# Patient Record
Sex: MALE | Race: WHITE | NOT HISPANIC OR LATINO | Employment: FULL TIME | RURAL
[De-identification: names, ages, dates, MRNs, and addresses within clinical notes are randomized per-mention and may not be internally consistent; named-entity substitution may affect disease eponyms.]

---

## 2021-09-15 ENCOUNTER — CLINICAL SUPPORT (OUTPATIENT)
Dept: PRIMARY CARE CLINIC | Facility: CLINIC | Age: 51
End: 2021-09-15

## 2021-09-15 PROCEDURE — 99172 PR VISUAL FUNCT SCREENING, BILAT: ICD-10-PCS | Mod: ,,, | Performed by: NURSE PRACTITIONER

## 2021-09-15 PROCEDURE — 99172 OCULAR FUNCTION SCREEN: CPT | Mod: ,,, | Performed by: NURSE PRACTITIONER

## 2022-10-05 ENCOUNTER — CLINICAL SUPPORT (OUTPATIENT)
Dept: PRIMARY CARE CLINIC | Facility: CLINIC | Age: 52
End: 2022-10-05

## 2022-10-05 DIAGNOSIS — Z01.00 ENCOUNTER FOR VISION SCREENING: Primary | ICD-10-CM

## 2022-10-05 PROCEDURE — 99172 OCULAR FUNCTION SCREEN: CPT | Mod: ,,, | Performed by: NURSE PRACTITIONER

## 2022-10-05 PROCEDURE — 99172 PR VISUAL FUNCT SCREENING, BILAT: ICD-10-PCS | Mod: ,,, | Performed by: NURSE PRACTITIONER

## 2022-10-05 NOTE — PROGRESS NOTES
Subjective:       Patient ID: Burt Fairchild is a 52 y.o. male.    Chief Complaint: No chief complaint on file.    HPI  Review of Systems      Objective:      Physical Exam    Assessment:       Problem List Items Addressed This Visit    None  Visit Diagnoses       Encounter for vision screening    -  Primary            Plan:       Vision exam only

## 2023-11-15 ENCOUNTER — CLINICAL SUPPORT (OUTPATIENT)
Dept: PRIMARY CARE CLINIC | Facility: CLINIC | Age: 53
End: 2023-11-15
Payer: COMMERCIAL

## 2023-11-15 DIAGNOSIS — Z01.00 ENCOUNTER FOR VISION SCREENING: Primary | ICD-10-CM

## 2023-11-15 PROCEDURE — 99172 PR VISUAL FUNCT SCREENING, BILAT: ICD-10-PCS | Mod: ,,, | Performed by: NURSE PRACTITIONER

## 2023-11-15 PROCEDURE — 99172 OCULAR FUNCTION SCREEN: CPT | Mod: ,,, | Performed by: NURSE PRACTITIONER

## 2023-11-15 NOTE — PROGRESS NOTES
Subjective     Patient ID: Burt Fairchild is a 53 y.o. male.    Chief Complaint: No chief complaint on file.    HPI  Review of Systems       Objective     Physical Exam       Assessment and Plan     1. Encounter for vision screening        Vision exam only           No follow-ups on file.

## 2024-01-11 ENCOUNTER — OFFICE VISIT (OUTPATIENT)
Dept: FAMILY MEDICINE | Facility: CLINIC | Age: 54
End: 2024-01-11
Payer: COMMERCIAL

## 2024-01-11 VITALS
OXYGEN SATURATION: 98 % | TEMPERATURE: 98 F | RESPIRATION RATE: 16 BRPM | HEIGHT: 66 IN | DIASTOLIC BLOOD PRESSURE: 70 MMHG | SYSTOLIC BLOOD PRESSURE: 120 MMHG | WEIGHT: 169.31 LBS | BODY MASS INDEX: 27.21 KG/M2 | HEART RATE: 73 BPM

## 2024-01-11 DIAGNOSIS — R05.9 COUGH, UNSPECIFIED TYPE: ICD-10-CM

## 2024-01-11 DIAGNOSIS — J06.9 ACUTE UPPER RESPIRATORY INFECTION: ICD-10-CM

## 2024-01-11 DIAGNOSIS — J01.90 ACUTE NON-RECURRENT SINUSITIS, UNSPECIFIED LOCATION: Primary | ICD-10-CM

## 2024-01-11 LAB
CTP QC/QA: YES
CTP QC/QA: YES
FLUAV AG NPH QL: NEGATIVE
FLUBV AG NPH QL: NEGATIVE
SARS-COV-2 RDRP RESP QL NAA+PROBE: NEGATIVE

## 2024-01-11 PROCEDURE — 1160F RVW MEDS BY RX/DR IN RCRD: CPT | Mod: CPTII,,, | Performed by: NURSE PRACTITIONER

## 2024-01-11 PROCEDURE — 87635 SARS-COV-2 COVID-19 AMP PRB: CPT | Mod: QW,,, | Performed by: NURSE PRACTITIONER

## 2024-01-11 PROCEDURE — 3078F DIAST BP <80 MM HG: CPT | Mod: CPTII,,, | Performed by: NURSE PRACTITIONER

## 2024-01-11 PROCEDURE — 1159F MED LIST DOCD IN RCRD: CPT | Mod: CPTII,,, | Performed by: NURSE PRACTITIONER

## 2024-01-11 PROCEDURE — 87804 INFLUENZA ASSAY W/OPTIC: CPT | Mod: 59,QW,, | Performed by: NURSE PRACTITIONER

## 2024-01-11 PROCEDURE — 3008F BODY MASS INDEX DOCD: CPT | Mod: CPTII,,, | Performed by: NURSE PRACTITIONER

## 2024-01-11 PROCEDURE — 3074F SYST BP LT 130 MM HG: CPT | Mod: CPTII,,, | Performed by: NURSE PRACTITIONER

## 2024-01-11 PROCEDURE — 99203 OFFICE O/P NEW LOW 30 MIN: CPT | Mod: 25,,, | Performed by: NURSE PRACTITIONER

## 2024-01-11 PROCEDURE — 96372 THER/PROPH/DIAG INJ SC/IM: CPT | Mod: ,,, | Performed by: NURSE PRACTITIONER

## 2024-01-11 RX ORDER — CEFTRIAXONE 1 G/1
1 INJECTION, POWDER, FOR SOLUTION INTRAMUSCULAR; INTRAVENOUS
Status: COMPLETED | OUTPATIENT
Start: 2024-01-11 | End: 2024-01-11

## 2024-01-11 RX ORDER — AMOXICILLIN AND CLAVULANATE POTASSIUM 875; 125 MG/1; MG/1
1 TABLET, FILM COATED ORAL 2 TIMES DAILY
Qty: 20 TABLET | Refills: 0 | Status: SHIPPED | OUTPATIENT
Start: 2024-01-11 | End: 2024-01-21

## 2024-01-11 RX ORDER — DEXAMETHASONE SODIUM PHOSPHATE 4 MG/ML
8 INJECTION, SOLUTION INTRA-ARTICULAR; INTRALESIONAL; INTRAMUSCULAR; INTRAVENOUS; SOFT TISSUE
Status: COMPLETED | OUTPATIENT
Start: 2024-01-11 | End: 2024-01-11

## 2024-01-11 RX ORDER — LATANOPROST 50 UG/ML
1 SOLUTION/ DROPS OPHTHALMIC DAILY
COMMUNITY

## 2024-01-11 RX ADMIN — DEXAMETHASONE SODIUM PHOSPHATE 8 MG: 4 INJECTION, SOLUTION INTRA-ARTICULAR; INTRALESIONAL; INTRAMUSCULAR; INTRAVENOUS; SOFT TISSUE at 01:01

## 2024-01-11 RX ADMIN — CEFTRIAXONE 1 G: 1 INJECTION, POWDER, FOR SOLUTION INTRAMUSCULAR; INTRAVENOUS at 01:01

## 2024-01-11 NOTE — PROGRESS NOTES
"Subjective:       Burt Fairchild is a 53 y.o. male who presents for evaluation of symptoms of a URI. Symptoms include congestion, cough described as productive, headache described as pressure, post nasal drip, and sore throat. Onset of symptoms was 2 weeks ago, and has been unchanged since that time. Treatment to date: decongestants and theraflu .    Review of Systems  Pertinent items are noted in HPI.     Objective:      /70 (BP Location: Left arm, Patient Position: Sitting, BP Method: Large (Manual))   Pulse 73   Temp 98.3 °F (36.8 °C) (Oral)   Resp 16   Ht 5' 6" (1.676 m)   Wt 76.8 kg (169 lb 4.8 oz)   SpO2 98%   BMI 27.33 kg/m²   General appearance: alert, appears stated age, and cooperative  Head: Normocephalic, without obvious abnormality, atraumatic  Eyes: conjunctivae/corneas clear. PERRL, EOM's intact. Fundi benign.  Ears: abnormal TM right ear - dull and abnormal TM left ear - dull  Nose: Nares normal. Septum midline. Mucosa normal. No drainage or sinus tenderness., moderate congestion  Throat: abnormal findings: mild oropharyngeal erythema and PND  Lungs: clear to auscultation bilaterally  Heart: regular rate and rhythm, S1, S2 normal, no murmur, click, rub or gallop  Extremities: extremities normal, atraumatic, no cyanosis or edema  Skin: Skin color, texture, turgor normal. No rashes or lesions  Lymph nodes: Cervical, supraclavicular, and axillary nodes normal.  Neurologic: Alert and oriented X 3, normal strength and tone. Normal symmetric reflexes. Normal coordination and gait     Assessment:      sinusitis     Plan:      Discussed diagnosis and treatment of URI.  Suggested symptomatic OTC remedies.  Nasal saline spray for congestion.  Augmentin per orders.  Follow up as needed.   "

## 2024-02-01 ENCOUNTER — OFFICE VISIT (OUTPATIENT)
Dept: PRIMARY CARE CLINIC | Facility: CLINIC | Age: 54
End: 2024-02-01
Payer: OTHER MISCELLANEOUS

## 2024-02-01 ENCOUNTER — HOSPITAL ENCOUNTER (OUTPATIENT)
Dept: RADIOLOGY | Facility: HOSPITAL | Age: 54
Discharge: HOME | End: 2024-02-01
Attending: NURSE PRACTITIONER
Payer: COMMERCIAL

## 2024-02-01 VITALS
OXYGEN SATURATION: 97 % | SYSTOLIC BLOOD PRESSURE: 131 MMHG | BODY MASS INDEX: 27.16 KG/M2 | WEIGHT: 169 LBS | DIASTOLIC BLOOD PRESSURE: 79 MMHG | TEMPERATURE: 99 F | RESPIRATION RATE: 20 BRPM | HEIGHT: 66 IN | HEART RATE: 70 BPM

## 2024-02-01 DIAGNOSIS — S60.222A CONTUSION OF LEFT HAND, INITIAL ENCOUNTER: Primary | ICD-10-CM

## 2024-02-01 DIAGNOSIS — S60.222A CONTUSION OF LEFT HAND, INITIAL ENCOUNTER: ICD-10-CM

## 2024-02-01 PROCEDURE — 99203 OFFICE O/P NEW LOW 30 MIN: CPT | Mod: ,,, | Performed by: NURSE PRACTITIONER

## 2024-02-01 PROCEDURE — 73130 X-RAY EXAM OF HAND: CPT | Mod: 26,LT,, | Performed by: RADIOLOGY

## 2024-02-01 PROCEDURE — 73130 X-RAY EXAM OF HAND: CPT | Mod: TC,LT

## 2024-02-01 NOTE — PROGRESS NOTES
Subjective     Patient ID: Burt Fairchild is a 53 y.o. male.    Chief Complaint: Work Related Injury (Patient presents here today with work related injury to left hand that occurred on 2/1/24.)    54 y/o wm presents with complaints of left hand pain, mcgrath side. 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 There is a knot on the mcgrath side MCP joint 3rd finger. Unable to completely close hand especially the 3rd finger due to pain and stiffness.       Review of Systems   Musculoskeletal:  Positive for joint swelling. Negative for joint deformity.   All other systems reviewed and are negative.     Objective     Physical Exam  Vitals and nursing note reviewed.   Constitutional:       Appearance: Normal appearance.   HENT:      Head: Normocephalic.   Eyes:      Pupils: Pupils are equal, round, and reactive to light.   Cardiovascular:      Rate and Rhythm: Normal rate and regular rhythm.      Heart sounds: Normal heart sounds.   Pulmonary:      Effort: Pulmonary effort is normal.      Breath sounds: Normal breath sounds.   Musculoskeletal:         General: Swelling, tenderness and signs of injury present.      Left hand: Tenderness present. Decreased range of motion.        Arms:       Cervical back: Normal range of motion.   Skin:     General: Skin is warm and dry.      Findings: No bruising.   Neurological:      General: No focal deficit present.      Mental Status: He is alert and oriented to person, place, and time.   Psychiatric:         Attention and Perception: Attention and perception normal.         Mood and Affect: Mood normal.         Speech: Speech normal.         Behavior: Behavior normal. Behavior is cooperative.         Cognition and Memory: Cognition normal.         Judgment: Judgment normal.       Imaging: X-Ray Hand 3 view Left    Result Date: 2/1/2024  EXAMINATION: XR HAND COMPLETE 3 VIEW LEFT CLINICAL HISTORY: .  Contusion of left hand, initial encounter COMPARISON: No  previous similar TECHNIQUE: AP, lateral, and oblique views left hand   FINDINGS: There is no acute fracture, dislocation, or focal destructive osseous abnormality.  Osseous structures are well mineralized     No acute radiographic abnormality Electronically signed by: Dillon Anand Date:    02/01/2024 Time:    14:09       Assessment and Plan     1. Contusion of left hand, initial encounter  -     X-Ray Hand 3 view Left; Future; Expected date: 02/01/2024        RTW with limited repetitive motion left hand. Ibuprofen every 6-8 hours. Warm Epson salt soaks bid-tid. RTN to [facility] in 1 week.          No follow-ups on file.

## 2024-02-08 ENCOUNTER — OFFICE VISIT (OUTPATIENT)
Dept: PRIMARY CARE CLINIC | Facility: CLINIC | Age: 54
End: 2024-02-08
Payer: OTHER MISCELLANEOUS

## 2024-02-08 VITALS
OXYGEN SATURATION: 97 % | WEIGHT: 169 LBS | TEMPERATURE: 98 F | HEIGHT: 66 IN | BODY MASS INDEX: 27.16 KG/M2 | SYSTOLIC BLOOD PRESSURE: 125 MMHG | RESPIRATION RATE: 20 BRPM | DIASTOLIC BLOOD PRESSURE: 73 MMHG | HEART RATE: 68 BPM

## 2024-02-08 DIAGNOSIS — S60.222D CONTUSION OF LEFT HAND, SUBSEQUENT ENCOUNTER: Primary | ICD-10-CM

## 2024-02-08 DIAGNOSIS — M65.332 TRIGGER FINGER, LEFT MIDDLE FINGER: ICD-10-CM

## 2024-02-08 PROCEDURE — 99213 OFFICE O/P EST LOW 20 MIN: CPT | Mod: ,,, | Performed by: NURSE PRACTITIONER

## 2024-02-08 RX ORDER — MELOXICAM 7.5 MG/1
7.5 TABLET ORAL DAILY
Qty: 10 TABLET | Refills: 0 | Status: SHIPPED | OUTPATIENT
Start: 2024-02-08

## 2024-02-08 NOTE — PROGRESS NOTES
Subjective     Patient ID: Burt Fairchild is a 53 y.o. male.    Chief Complaint: Work Related Injury (Patient presents here today with work related injury to left hand that occurred on 2/1/24.)    54 y/o wm presents for follow up left hand pain, stiffness and swelling. He has worked at [employer] for almost 20 years and has already had trigger finger surgery on the right hand. He can make a fist now with left hand but he says the 2nd finger doesn't want to stay closed. He also complains of pain when the MCP joint of 3rd finger is palpated. He also feels like he has to pry his fingers to open especially the 2nd and 3rd.       Review of Systems   Musculoskeletal:  Negative for joint swelling and joint deformity.   All other systems reviewed and are negative.         Objective     Physical Exam  Vitals and nursing note reviewed.   Constitutional:       Appearance: Normal appearance.   HENT:      Head: Normocephalic.   Eyes:      Pupils: Pupils are equal, round, and reactive to light.   Cardiovascular:      Rate and Rhythm: Normal rate and regular rhythm.      Heart sounds: Normal heart sounds.   Pulmonary:      Effort: Pulmonary effort is normal.      Breath sounds: Normal breath sounds.   Musculoskeletal:         General: Tenderness present. No swelling. Normal range of motion.      Cervical back: Normal range of motion.   Skin:     General: Skin is warm and dry.   Neurological:      General: No focal deficit present.      Mental Status: He is alert and oriented to person, place, and time.   Psychiatric:         Attention and Perception: Attention and perception normal.         Mood and Affect: Mood normal.         Speech: Speech normal.         Behavior: Behavior normal. Behavior is cooperative.         Cognition and Memory: Cognition normal.         Judgment: Judgment normal.            Assessment and Plan     1. Contusion of left hand, subsequent encounter    2. Trigger finger, left middle finger    Other  orders  -     meloxicam (MOBIC) 7.5 MG tablet; Take 1 tablet (7.5 mg total) by mouth once daily.  Dispense: 10 tablet; Refill: 0        Appt with Dr. Bennett 2/12/24 at 3:50 pm. RTW with limited use of left hand. No bucking rivets. RTN to [facility] PRN. Take medication as prescribed.          No follow-ups on file.

## 2024-02-12 ENCOUNTER — OFFICE VISIT (OUTPATIENT)
Dept: ORTHOPEDICS | Facility: CLINIC | Age: 54
End: 2024-02-12
Payer: OTHER MISCELLANEOUS

## 2024-02-12 DIAGNOSIS — M65.332 TRIGGER FINGER, LEFT MIDDLE FINGER: Primary | ICD-10-CM

## 2024-02-12 PROCEDURE — 99203 OFFICE O/P NEW LOW 30 MIN: CPT | Mod: S$PBB,,, | Performed by: ORTHOPAEDIC SURGERY

## 2024-02-12 PROCEDURE — 99213 OFFICE O/P EST LOW 20 MIN: CPT | Mod: PBBFAC | Performed by: ORTHOPAEDIC SURGERY

## 2024-02-12 RX ORDER — TADALAFIL 10 MG/1
10 TABLET ORAL DAILY PRN
COMMUNITY
Start: 2023-09-26

## 2024-02-12 RX ORDER — PANTOPRAZOLE SODIUM 20 MG/1
40 TABLET, DELAYED RELEASE ORAL DAILY
COMMUNITY

## 2024-02-12 NOTE — PROGRESS NOTES
Clinic Note        CC:   Chief Complaint   Patient presents with    Left Hand - Pain        Principal problem: No primary diagnosis found.     REASON FOR VISIT:       HISTORY:  53-year-old male complaining left hand pain in his left hand dominant he was at work he was grasping something felt something pop in his hand since that time he has had swelling over his long finger A1 pulley but also having locking down of his long ring and little finger on the left hand      PAST MEDICAL HISTORY:   Past Medical History:   Diagnosis Date    Unspecified glaucoma           PAST SURGICAL HISTORY:   Past Surgical History:   Procedure Laterality Date    CARPAL TUNNEL RELEASE      Right trigger finger Right     SHOULDER SURGERY            ALLERGIES: Review of patient's allergies indicates:  No Known Allergies     MEDICATIONS :    Current Outpatient Medications:     tadalafiL (CIALIS) 10 MG tablet, Take 10 mg by mouth daily as needed., Disp: , Rfl:     latanoprost 0.005 % ophthalmic solution, Place 1 drop into both eyes once daily., Disp: , Rfl:     meloxicam (MOBIC) 7.5 MG tablet, Take 1 tablet (7.5 mg total) by mouth once daily., Disp: 10 tablet, Rfl: 0    pantoprazole (PROTONIX) 20 MG tablet, Take 40 mg by mouth once daily., Disp: , Rfl:      SOCIAL HISTORY:   Social History     Socioeconomic History    Marital status: Unknown   Tobacco Use    Smoking status: Never    Smokeless tobacco: Never   Substance and Sexual Activity    Alcohol use: Yes     Comment: rarely    Drug use: Never          FAMILY HISTORY: History reviewed. No pertinent family history.       PHYSICAL EXAM:  In general, this is a well-developed, well-nourished male . The patient is alert, oriented and cooperative.      HEENT:  Normocephalic, atraumatic.  Extraocular movements are intact bilaterally.  The oropharynx is benign.       NECK:  Nontender with good range of motion.      LUNGS:  Clear to auscultation bilaterally.      HEART:  Demonstrates a  regular rate and rhythm.  No murmurs appreciated.      ABDOMEN:  Soft, non-tender, non-distended.        EXTREMITIES:  Left upper extremity moves his fingers he has sensation to touch has palpable pulses he lock stem when he fully flexes his hand over his long ring and little fingers.  He has a cyst at the A1 pulley of his long finger he has little bit of ecchymosis in his area      RADIOGRAPHIC FINDINGS:  X-rays show no fracture subluxation left hand      IMPRESSION: There are no diagnoses linked to this encounter.     PLAN:  Left long ring and little finger trigger fingers we discussed treatment options he has the cyst on the long finger at this time after discussing risks and benefits he wished to proceed with trigger finger release he will discuss this with work and we will schedule it in the near future.  There are no Patient Instructions on file for this visit.      No follow-ups on file.         Xu Bennett      (Subject to voice recognition error, transcription service not allowed)

## 2024-02-12 NOTE — LETTER
February 12, 2024      [facility]  [address] [place] [id number]  Phone: [phone]  Fax: [phone]       Patient: Burt Fairchild   YOB: 1970  Date of Visit: 02/12/2024    To Whom It May Concern:    KARAN Fairchild  was at [facility] on 02/12/2024. The patient may return to work/school on 02/13/2024 with restrictions of limited use of left hand. If you have any questions or concerns, or if I can be of further assistance, please do not hesitate to contact me.    Sincerely,    MD Murtaza Riley MA

## 2024-09-13 RX ORDER — PANTOPRAZOLE SODIUM 40 MG/1
40 TABLET, DELAYED RELEASE ORAL
Qty: 60 TABLET | Refills: 2 | Status: SHIPPED | OUTPATIENT
Start: 2024-09-13

## 2024-11-08 ENCOUNTER — OFFICE VISIT (OUTPATIENT)
Dept: FAMILY MEDICINE | Facility: CLINIC | Age: 54
End: 2024-11-08
Payer: COMMERCIAL

## 2024-11-08 VITALS
HEIGHT: 66 IN | BODY MASS INDEX: 26.81 KG/M2 | WEIGHT: 166.81 LBS | OXYGEN SATURATION: 96 % | SYSTOLIC BLOOD PRESSURE: 138 MMHG | TEMPERATURE: 98 F | DIASTOLIC BLOOD PRESSURE: 91 MMHG | RESPIRATION RATE: 18 BRPM | HEART RATE: 78 BPM

## 2024-11-08 DIAGNOSIS — M54.50 ACUTE BILATERAL LOW BACK PAIN WITHOUT SCIATICA: Primary | ICD-10-CM

## 2024-11-08 PROCEDURE — 3080F DIAST BP >= 90 MM HG: CPT | Mod: CPTII,,, | Performed by: NURSE PRACTITIONER

## 2024-11-08 PROCEDURE — 3008F BODY MASS INDEX DOCD: CPT | Mod: CPTII,,, | Performed by: NURSE PRACTITIONER

## 2024-11-08 PROCEDURE — 99213 OFFICE O/P EST LOW 20 MIN: CPT | Mod: 25,,, | Performed by: NURSE PRACTITIONER

## 2024-11-08 PROCEDURE — 1160F RVW MEDS BY RX/DR IN RCRD: CPT | Mod: CPTII,,, | Performed by: NURSE PRACTITIONER

## 2024-11-08 PROCEDURE — 3075F SYST BP GE 130 - 139MM HG: CPT | Mod: CPTII,,, | Performed by: NURSE PRACTITIONER

## 2024-11-08 PROCEDURE — 1159F MED LIST DOCD IN RCRD: CPT | Mod: CPTII,,, | Performed by: NURSE PRACTITIONER

## 2024-11-08 PROCEDURE — 96372 THER/PROPH/DIAG INJ SC/IM: CPT | Mod: ,,, | Performed by: NURSE PRACTITIONER

## 2024-11-08 RX ORDER — KETOROLAC TROMETHAMINE 30 MG/ML
60 INJECTION, SOLUTION INTRAMUSCULAR; INTRAVENOUS
Status: COMPLETED | OUTPATIENT
Start: 2024-11-08 | End: 2024-11-08

## 2024-11-08 RX ORDER — DEXAMETHASONE SODIUM PHOSPHATE 4 MG/ML
8 INJECTION, SOLUTION INTRA-ARTICULAR; INTRALESIONAL; INTRAMUSCULAR; INTRAVENOUS; SOFT TISSUE
Status: COMPLETED | OUTPATIENT
Start: 2024-11-08 | End: 2024-11-08

## 2024-11-08 RX ADMIN — DEXAMETHASONE SODIUM PHOSPHATE 8 MG: 4 INJECTION, SOLUTION INTRA-ARTICULAR; INTRALESIONAL; INTRAMUSCULAR; INTRAVENOUS; SOFT TISSUE at 04:11

## 2024-11-08 RX ADMIN — KETOROLAC TROMETHAMINE 60 MG: 30 INJECTION, SOLUTION INTRAMUSCULAR; INTRAVENOUS at 04:11

## 2024-12-12 NOTE — PROGRESS NOTES
"Subjective:      Burt Fairchild is a 54 y.o. male who presents for evaluation of low back pain. The patient has had recurrent self limited episodes of low back pain in the past. Symptoms have been present for a few days and are unchanged.  Onset was related to / precipitated by no known injury. The pain is located in the across the lower back and does not radiate. The pain is described as aching and soreness and occurs intermittently. He rates his pain as mild. Symptoms are exacerbated by nothing in particular. Symptoms are improved by chiropractic manipulation and NSAIDs. He has also tried nothing which provided no symptom relief. He has no other symptoms associated with the back pain. The patient has no "red flag" history indicative of complicated back pain.    The following portions of the patient's history were reviewed and updated as appropriate: allergies, current medications, past family history, past medical history, past social history, past surgical history, and problem list.    Review of Systems  Pertinent items are noted in HPI.      Objective:    Inspection and palpation: inspection of back is normal, no tenderness noted, antalgic gait.  Muscle tone and ROM exam: muscle tone normal without spasm, full range of motion with pain, antalgic gait.  Neurological: normal DTRs, muscle strength and reflexes.       Assessment:      Nonspecific acute low back pain      Plan:      Natural history and expected course discussed. Questions answered.  Proper lifting, bending technique discussed.  Short (2-4 day) period of relative rest recommended until acute symptoms improve.  Ice to affected area as needed for local pain relief.  OTC analgesics as needed.   "

## 2024-12-23 ENCOUNTER — CLINICAL SUPPORT (OUTPATIENT)
Dept: PRIMARY CARE CLINIC | Facility: CLINIC | Age: 54
End: 2024-12-23

## 2024-12-23 DIAGNOSIS — Z01.00 ENCOUNTER FOR VISION SCREENING: Primary | ICD-10-CM

## 2024-12-23 PROCEDURE — 99172 OCULAR FUNCTION SCREEN: CPT | Mod: ,,, | Performed by: NURSE PRACTITIONER

## 2024-12-23 NOTE — PROGRESS NOTES
Patient ID: Burt Fairchild is a 54 y.o. male.    Chief Complaint: No chief complaint on file.    History of Present Illness              Physical Exam              Assessment & Plan               1. Encounter for vision screening        No follow-ups on file.    This note was generated with the assistance of ambient listening technology. Verbal consent was obtained by the patient and accompanying visitor(s) for the recording of patient appointment to facilitate this note. I attest to having reviewed and edited the generated note for accuracy, though some syntax or spelling errors may persist. Please contact the author of this note for any clarification.